# Patient Record
Sex: FEMALE | Race: BLACK OR AFRICAN AMERICAN | Employment: FULL TIME | URBAN - METROPOLITAN AREA
[De-identification: names, ages, dates, MRNs, and addresses within clinical notes are randomized per-mention and may not be internally consistent; named-entity substitution may affect disease eponyms.]

---

## 2018-08-14 ENCOUNTER — HOSPITAL ENCOUNTER (OUTPATIENT)
Dept: PHYSICAL THERAPY | Age: 28
Discharge: HOME | End: 2018-08-14
Payer: SELF-PAY

## 2018-08-14 PROCEDURE — 97110 THERAPEUTIC EXERCISES: CPT

## 2018-08-14 PROCEDURE — 97161 PT EVAL LOW COMPLEX 20 MIN: CPT

## 2018-08-14 PROCEDURE — 97140 MANUAL THERAPY 1/> REGIONS: CPT

## 2018-08-14 NOTE — PROGRESS NOTES
PHYSICAL THERAPY - DAILY TREATMENT NOTE    Patient Name: Kun Weeks        Date: 2018  : 1990   yes Patient  Verified  Visit #:   1   of   8  Insurance: Payor: SELF PAY / Plan: [employer] / Product Type: Self Pay /      In time: 2:30 Out time: 3:28   Total Treatment Time: 58     Medicare/[facility] Time Tracking (below)   Total Timed Codes (min):  N/A 1:1 Treatment Time:  N/A     TREATMENT AREA =  Low back pain [M54.5]    SUBJECTIVE  Pain Level (on 0 to 10 scale):  6  / 10   Medication Changes/New allergies or changes in medical history, any new surgeries or procedures?    no  If yes, update Summary List   Subjective Functional Status/Changes:  []  No changes reported     See initial eval          OBJECTIVE  Modalities Rationale:     decrease inflammation and decrease pain to improve patient's ability to complete transfers, sitting, prolonged standing   min [] Estim, type/location:                                      []  att     []  unatt     []  w/US     []  w/ice    []  w/heat    min []  Mechanical Traction: type/lbs                   []  pro   []  sup   []  int   []  cont    []  before manual    []  after manual    min []  Ultrasound, settings/location:      min []  Iontophoresis w/ dexamethasone, location:                                               []  take home patch       []  in clinic   10 min [x]  Ice     []  Heat    location/position: Supine to l/s with legs elevated on wedge    min []  Vasopneumatic Device, press/temp:     min []  Other:    [x] Skin assessment post-treatment (if applicable):    [x]  intact    [x]  no adverse reaction     []redness  adverse reaction:        10 min Therapeutic Exercise:  [x]  See flow sheet   Rationale:      increase ROM and increase strength to improve the patients ability to complete transfers, sitting. 12 min Manual Therapy: MET to correct L post rotated innominate. STM to L glute max/med, piriformis, QL and l/s paraspinals in R side-lying. Rationale:      decrease pain, increase ROM and decrease trigger points to improve patient's ability to complete transfers, sitting. N/A min Patient Education:  yes  Reviewed HEP   []  Progressed/Changed HEP based on:  Pt instructed on and given HEP to be completed daily. Pt educated on correct body mechanics with transfers. Pt educated on red flags and to seek immediate medical attention/911 if those occur. Pt instructed to use ice prn 10-15 minutes on / 1 hour off. Reviewed POC and goals. Pt reports understanding. Other Objective/Functional Measures:    See initial eval     Post Treatment Pain Level (on 0 to 10) scale:   4  / 10     ASSESSMENT  Assessment/Changes in Function:     See initial eval     []  See Progress Note/Recertification   Patient will continue to benefit from skilled PT services to see initial eval   Progress toward goals / Updated goals:    Pt denied sharp pains or red flags with therapeutic ex or initial eval. Pt reported an improvement in pain/sxs at end of session. See initial eval.     PLAN  [x]  Upgrade activities as tolerated yes Continue plan of care   []  Discharge due to :    [x]  Other: PT 2x/week for 4 weeks.      Therapist: Paula Bartholomew PT    Date: 8/14/2018 Time: 3:28 PM     Future Appointments  Date Time Provider Raquel Conteh   8/21/2018 4:30  [facility]   8/23/2018 3:00  [facility]   8/28/2018 3:30  [facility]   8/30/2018 4:30  [facility]   9/4/2018 3:30 PM Paula Bartholomew PT [facility]   9/6/2018 3:30  [facility]   9/11/2018 3:30 PM Paula Bartholomew PT [facility]   9/13/2018 3:30 PM Tim Monreal [address] [facility]
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
## 2018-08-21 ENCOUNTER — HOSPITAL ENCOUNTER (OUTPATIENT)
Dept: PHYSICAL THERAPY | Age: 28
Discharge: HOME | End: 2018-08-21
Payer: SELF-PAY

## 2018-08-21 PROCEDURE — 97140 MANUAL THERAPY 1/> REGIONS: CPT

## 2018-08-21 PROCEDURE — 97110 THERAPEUTIC EXERCISES: CPT

## 2018-08-23 ENCOUNTER — HOSPITAL ENCOUNTER (OUTPATIENT)
Dept: PHYSICAL THERAPY | Age: 28
End: 2018-08-23
Payer: SELF-PAY

## 2018-08-28 ENCOUNTER — HOSPITAL ENCOUNTER (OUTPATIENT)
Dept: PHYSICAL THERAPY | Age: 28
Discharge: HOME | End: 2018-08-28
Payer: SELF-PAY

## 2018-08-28 PROCEDURE — 97140 MANUAL THERAPY 1/> REGIONS: CPT

## 2018-08-28 PROCEDURE — 97110 THERAPEUTIC EXERCISES: CPT

## 2018-08-28 NOTE — PROGRESS NOTES
PHYSICAL THERAPY - DAILY TREATMENT NOTE Patient Name: Faviola Hernandez        Date: 2018 : 1990   yes Patient  Verified Visit #:   3   of   8  Insurance: Payor: SELF PAY / Plan: [facility] SELF PAY / Product Type: Self Pay / In time: 3:25 Out time: 4:32 Total Treatment Time: 79 Medicare/[facility] Time Tracking (below) Total Timed Codes (min):  NA 1:1 Treatment Time:  NA  
 
TREATMENT AREA =  Low back pain [M54.5] SUBJECTIVE Pain Level (on 0 to 10 scale):  2  / 10 Medication Changes/New allergies or changes in medical history, any new surgeries or procedures?    no  If yes, update Summary List  
Subjective Functional Status/Changes:  []  No changes reported Patient reports she took 800mg ibuprofen 45 minutes prior to today's PT session. 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 \"I was able to sit there for an hour before the pain really set in and I took three 2 minute breaks to help my back. \"  
Also reports she went \"swimming on  for 1.5 hour, took a 45 minute break, then swam for another 45 minutes. I think I might've overdone it because my back was bothering me. \" OBJECTIVE Modalities Rationale:     decrease inflammation and decrease pain to improve patient's ability to perform transfers and prolonged sitting/standing activities  
 min [] Estim, type/location:    
                                 []  att     []  unatt     []  w/US     []  w/ice    []  w/heat  
 min []  Mechanical Traction: type/lbs   
               []  pro   []  sup   []  int   []  cont    []  before manual    []  after manual  
 min []  Ultrasound, settings/location:    
 min []  Iontophoresis w/ dexamethasone, location:   
                                           []  take home patch       []  in clinic  
10 min [x]  Ice     []  Heat    location/position: Supine with wedge to L/S post-session min []  Vasopneumatic Device, press/temp:   
 min []  Other:   
[] Skin assessment post-treatment (if applicable):   
[]  intact    []  redness- no adverse reaction    
[]redness  adverse reaction:     
 
47 min Therapeutic Exercise:  [x]  See flow sheet Rationale:      increase ROM and increase strength to improve the patients ability to perform pain-free sitting/standing activities 10 min Manual Therapy: STM to L L/S paraspinals, QL, glute med/piriformis in R sidelying; SI check - even Rationale:      decrease pain, increase ROM and decrease trigger points to improve patient's ability to return to pain-free sitting during work activities. min Therapeutic Activity:   
Rationale:     
 
 min Neuromuscular Re-ed:   
Rationale:  
 
 min Gait Training:   
Rationale:     
 
 min Patient Education:  yes  Reviewed HEP []  Progressed/Changed HEP based on: Other Objective/Functional Measures: 
 
Presented with increased tenderness and tone along L/S paraspinals and glute med/piriformis; noted minimal wincing during MT Added standing hip abd/extension and SLS with ab draw Post Treatment Pain Level (on 0 to 10) scale:   2  / 10 ASSESSMENT Assessment/Changes in Function:  
 
Continues to require cues for TA activation and maintain neutral spine/PPT during exercises. Demonstrated good tolerance during PT session; noted no exacerbation of symptoms or red flags. Discussed and educated patient on proper work station posture to decrease c/o back pain; patient responded with good understanding. []  See Progress Note/Recertification Patient will continue to benefit from skilled PT services to modify and progress therapeutic interventions, address functional mobility deficits, address ROM deficits, address strength deficits, analyze and address soft tissue restrictions, analyze and cue movement patterns, analyze and modify body mechanics/ergonomics and assess and modify postural abnormalities to attain remaining goals. Progress toward goals / Updated goals: 
 
Reports compliance with HEP (STG #1 MET) Slow progress towards L/S ROM goals PLAN 
[]  Upgrade activities as tolerated yes Continue plan of care  
[]  Discharge due to :   
[]  Other:   
 
Therapist: Charline Angelucci, LPTA Date: 8/28/2018 Time: 6:18 PM  
 
Future Appointments Date Time Provider Raquel Conteh 8/28/2018 3:30 PM [address] [place] [address]  
8/30/2018 4:30 PM Charline Angelucci [facility]  
9/4/2018 3:30 PM Mg Beverage, PT [facility]  
9/6/2018 3:30 PM [address] [place] [address]  
9/11/2018 3:30 PM Mg Beverage, PT [facility]  
9/13/2018 3:30 PM Abhi Patel [address] [place] [address]

## 2018-08-30 ENCOUNTER — HOSPITAL ENCOUNTER (OUTPATIENT)
Dept: PHYSICAL THERAPY | Age: 28
Discharge: HOME | End: 2018-08-30
Payer: SELF-PAY

## 2018-08-30 PROCEDURE — 97110 THERAPEUTIC EXERCISES: CPT

## 2018-08-30 PROCEDURE — 97140 MANUAL THERAPY 1/> REGIONS: CPT

## 2018-08-30 NOTE — PROGRESS NOTES
PHYSICAL THERAPY - DAILY TREATMENT NOTE Patient Name: Lachelle Perez        Date: 2018 : 1990   yes Patient  Verified Visit #:   4   of   8  Insurance: Payor: SELF PAY / Plan: [facility] SELF PAY / Product Type: Self Pay / In time: 4:25 Out time: 5:28 Total Treatment Time: 61 Medicare/[employer] Time Tracking (below) Total Timed Codes (min):  NA 1:1 Treatment Time:  NA  
 
TREATMENT AREA =  Low back pain [M54.5] SUBJECTIVE Pain Level (on 0 to 10 scale):  7  / 10 Medication Changes/New allergies or changes in medical history, any new surgeries or procedures?    no  If yes, update Summary List  
Subjective Functional Status/Changes:  []  No changes reported \"It's been hurting on and off for the last 3 days in my L butt area. No pain in the back. I notice it more in the morning and randomly at work. I can sit for ~20 minutes before I really feel it. I didn't take that ibuprofen cause I can tolerate it\" OBJECTIVE Modalities Rationale:     decrease inflammation and decrease pain to improve patient's ability to perform transfers and prolonged sitting/standing activities  
 min [] Estim, type/location:    
                                 []  att     []  unatt     []  w/US     []  w/ice    []  w/heat  
 min []  Mechanical Traction: type/lbs   
               []  pro   []  sup   []  int   []  cont    []  before manual    []  after manual  
 min []  Ultrasound, settings/location:    
 min []  Iontophoresis w/ dexamethasone, location:   
                                           []  take home patch       []  in clinic  
10 min [x]  Ice     []  Heat    location/position: Supine with wedge to L/S post-session  
 min []  Vasopneumatic Device, press/temp:   
 min []  Other:   
[] Skin assessment post-treatment (if applicable):   
[]  intact    []  redness- no adverse reaction    
[]redness  adverse reaction:     
 
43 min Therapeutic Exercise:  [x]  See flow sheet Rationale:      increase ROM and increase strength to improve the patients ability to perform pain-free sitting/standing activities 10 min Manual Therapy: STM/DTM to glute med/piriformis in R sidelying; SI check - L posterior innominate; MET for even pelvis Rationale:      decrease pain, increase ROM and decrease trigger points to improve patient's ability to return to pain-free sitting during work activities. min Therapeutic Activity:   
Rationale:     
 
 min Neuromuscular Re-ed:   
Rationale:  
 
 min Gait Training:   
Rationale:     
 
 min Patient Education:  yes  Reviewed HEP []  Progressed/Changed HEP based on: Other Objective/Functional Measures: 
 
TE per flowsheet Increased balance time for SLS with ab draw Added small towel roll to L/S during PPT for tactile cue Post Treatment Pain Level (on 0 to 10) scale:   2  / 10 ASSESSMENT Assessment/Changes in Function:  
 
Demonstrated improved tolerance with exercises following MT; noted overall improved L/S AROM and decrease low back pain following today's PT session. []  See Progress Note/Recertification Patient will continue to benefit from skilled PT services to modify and progress therapeutic interventions, address functional mobility deficits, address ROM deficits, address strength deficits, analyze and address soft tissue restrictions, analyze and cue movement patterns, analyze and modify body mechanics/ergonomics and assess and modify postural abnormalities to attain remaining goals. Progress toward goals / Updated goals: 
 
Good progress towards STG #2 PLAN 
[]  Upgrade activities as tolerated yes Continue plan of care  
[]  Discharge due to :   
[]  Other:   
 
Therapist: ONEL Fernandez Date: 8/30/2018 Time: 6:54 PM  
 
Future Appointments Date Time Provider Raquel Conteh 8/30/2018 4:30 PM [address]  
9/4/2018 3:30 PM [address] 9/6/2018 3:30 PM [address] [facility]  
9/11/2018 3:30 PM RAMSES Montelongo [facility]  
9/13/2018 3:30 PM Earle Hernandez 600 [facility]

## 2018-09-04 ENCOUNTER — HOSPITAL ENCOUNTER (OUTPATIENT)
Dept: PHYSICAL THERAPY | Age: 28
Discharge: HOME | End: 2018-09-04
Payer: SELF-PAY

## 2018-09-04 PROCEDURE — 97140 MANUAL THERAPY 1/> REGIONS: CPT

## 2018-09-04 PROCEDURE — 97110 THERAPEUTIC EXERCISES: CPT

## 2018-09-04 NOTE — PROGRESS NOTES
PHYSICAL THERAPY - DAILY TREATMENT NOTE Patient Name: lCiff Worley        Date: 2018 : 1990   yes Patient  Verified Visit #:   5   of   8  Insurance: Payor: SELF PAY / Plan: [facility] SELF PAY / Product Type: Self Pay / In time: 3:28 Out time: 4:38 Total Treatment Time: 70 Medicare/[employer] Time Tracking (below) Total Timed Codes (min):  N/A 1:1 Treatment Time:  N/A  
 
TREATMENT AREA =  Low back pain [M54.5] SUBJECTIVE Pain Level (on 0 to 10 scale):  5  / 10 Medication Changes/New allergies or changes in medical history, any new surgeries or procedures?    no  If yes, update Summary List  
Subjective Functional Status/Changes:  []  No changes reported Avg pain level; 5/10, Max pain level: 8/10 (prolonged standing in heels at her sister's wedding this past Saturday). Pt reports compliance with HEP. Pt denies falls or red flags. OBJECTIVE Modalities Rationale:     decrease inflammation and decrease pain to improve patient's ability to complete prolonged standing. 
 min [] Estim, type/location:    
                                 []  att     []  unatt     []  w/US     []  w/ice    []  w/heat  
 min []  Mechanical Traction: type/lbs   
               []  pro   []  sup   []  int   []  cont    []  before manual    []  after manual  
 min []  Ultrasound, settings/location:    
 min []  Iontophoresis w/ dexamethasone, location:   
                                           []  take home patch       []  in clinic  
10 min [x]  Ice     []  Heat    location/position: Supine to l/s and glutes  
 min []  Vasopneumatic Device, press/temp:   
 min []  Other:   
[] Skin assessment post-treatment (if applicable):   
[]  intact    []  redness- no adverse reaction    
[]redness  adverse reaction:     
 
48 min Therapeutic Exercise:  [x]  See flow sheet Rationale:      increase ROM, increase strength and increase proprioception to improve the patients ability to complete prolonged standing. 12 min Manual Therapy: STM to L>R glute max/med, piriformis, QL and l/s paraspinals in prone. Rationale:      decrease pain, increase ROM and decrease trigger points to improve patient's ability to complete prolonged standing. N/A min Patient Education:  yes  Reviewed HEP []  Progressed/Changed HEP based on:  Reviewed importance of body mechanics. Pt instructed to use CP/ice prn 10-15 minutes on / 1 hour off. Pt reports understanding. Other Objective/Functional Measures: 
 
Increased reps to improve glute strength/stability Added: Pall of press and mini-bands to improve core stability and glute strength L/s AROM: WFL B lat flex, all other motions decreased 25% Post Treatment Pain Level (on 0 to 10) scale:   3  / 10 ASSESSMENT Assessment/Changes in Function:  
 
Pt denied sharp pains or red flags with therapeutic ex. Pt reported an improvement in pain/sxs at end of session. []  See Progress Note/Recertification Patient will continue to benefit from skilled PT services to modify and progress therapeutic interventions, address functional mobility deficits, address ROM deficits, address strength deficits, analyze and address soft tissue restrictions and analyze and cue movement patterns to attain remaining goals. Progress toward goals / Updated goals: 
 
8/10 recent max pain levels - slow progress towards LTG #3. Pt demonstrates improvements in l/s AROM, progressing towards LTG #1. PLAN [x]  Upgrade activities as tolerated yes Continue plan of care  
[]  Discharge due to :   
[x]  Other: Reassess FOTO and GROC next session Therapist: Wade Cardenas PT Date: 9/4/2018 Time: 3:26 PM  
 
Future Appointments Date Time Provider Raquel Conteh 9/4/2018 3:30 PM Wade Cardenas PT [facility]  
9/6/2018 3:30 PM [address] [facility]  
9/11/2018 3:30 PM Wade Cardenas PT [facility]  
9/13/2018 3:30  [facility] 9/18/2018 3:00 PM Rose. Carlito [address] [facility]

## 2018-09-06 ENCOUNTER — HOSPITAL ENCOUNTER (OUTPATIENT)
Dept: PHYSICAL THERAPY | Age: 28
Discharge: HOME | End: 2018-09-06
Payer: SELF-PAY

## 2018-09-06 PROCEDURE — 97140 MANUAL THERAPY 1/> REGIONS: CPT

## 2018-09-06 PROCEDURE — 97110 THERAPEUTIC EXERCISES: CPT

## 2018-09-06 NOTE — PROGRESS NOTES
PHYSICAL THERAPY - DAILY TREATMENT NOTE Patient Name: Kun Weeks        Date: 2018 : 1990   yes Patient  Verified Visit #:   6   of   8  Insurance: Payor: SELF PAY / Plan: [facility] SELF PAY / Product Type: Self Pay / In time: 3:29 Out time: 4:38 Total Treatment Time: 71 Medicare/BCBS Time Tracking (below) Total Timed Codes (min):  NA 1:1 Treatment Time:  NA  
 
TREATMENT AREA =  Low back pain [M54.5] SUBJECTIVE Pain Level (on 0 to 10 scale):  5  / 10 Medication Changes/New allergies or changes in medical history, any new surgeries or procedures?    no  If yes, update Summary List  
Subjective Functional Status/Changes:  []  No changes reported \"I had to do a lot at work today. I had to move all these files from one file cabinet to another and I was carrying big stacks of the files. I probably didn't have the best posture when I was doing it either and that's why it hurts. But I have been using that towel roll to my low back in my office chair and car. I've noticed the difference. I did walk outside for 45 minutes OBJECTIVE Modalities Rationale:     decrease inflammation and decrease pain to improve patient's ability to perform transfers and prolonged sitting/standing activities  
 min [] Estim, type/location:    
                                 []  att     []  unatt     []  w/US     []  w/ice    []  w/heat  
 min []  Mechanical Traction: type/lbs   
               []  pro   []  sup   []  int   []  cont    []  before manual    []  after manual  
 min []  Ultrasound, settings/location:    
 min []  Iontophoresis w/ dexamethasone, location:   
                                           []  take home patch       []  in clinic  
10 min [x]  Ice     []  Heat    location/position: Supine with wedge to L/S post-session  
 min []  Vasopneumatic Device, press/temp:   
 min []  Other:   
[] Skin assessment post-treatment (if applicable):   
 []  intact    []  redness- no adverse reaction    
[]redness  adverse reaction:     
 
44 min Therapeutic Exercise:  [x]  See flow sheet Rationale:      increase ROM and increase strength to improve the patients ability to perform pain-free sitting/standing activities 15 min Manual Therapy: STM/DTM to glute med/piriformis in R sidelying; SI check - L post innominate; MET correction for innominate Rationale:      decrease pain, increase ROM and decrease trigger points to improve patient's ability to return to pain-free sitting during work activities. min Therapeutic Activity:   
Rationale:     
 
 min Neuromuscular Re-ed:   
Rationale:  
 
 min Gait Training:   
Rationale:     
 
 min Patient Education:  yes  Reviewed HEP []  Progressed/Changed HEP based on: Other Objective/Functional Measures: FOTO: 48 (28 point increase) GROC: +2 
 
TE per flowsheet Post Treatment Pain Level (on 0 to 10) scale:   3  / 10 ASSESSMENT Assessment/Changes in Function:  
 
Req'd cues for hip hinging during miniband exercises; demonstrates excessive L/S extension during exercises. Able to perform therex with no increase low back pain and denies red flags. []  See Progress Note/Recertification Patient will continue to benefit from skilled PT services to modify and progress therapeutic interventions, address functional mobility deficits, address ROM deficits, address strength deficits, analyze and address soft tissue restrictions, analyze and cue movement patterns, analyze and modify body mechanics/ergonomics and assess and modify postural abnormalities to attain remaining goals. Progress toward goals / Updated goals: LTG #2 MET 
  
 
PLAN 
[]  Upgrade activities as tolerated yes Continue plan of care  
[]  Discharge due to :   
[]  Other:   
 
Therapist: ONEL He Date: 9/6/2018 Time: 5:49 PM  
 
Future Appointments Date Time Provider Raquel Conteh 9/6/2018 3:30 PM [address] [facility]  
9/11/2018 3:30 PM Billy Santos,  [facility]  
9/13/2018 3:30 PM [address] [facility]  
9/18/2018 3:00 PM Billy Santos,  [facility]

## 2018-09-11 ENCOUNTER — HOSPITAL ENCOUNTER (OUTPATIENT)
Dept: PHYSICAL THERAPY | Age: 28
Discharge: HOME | End: 2018-09-11
Payer: SELF-PAY

## 2018-09-11 PROCEDURE — 97110 THERAPEUTIC EXERCISES: CPT

## 2018-09-11 PROCEDURE — 97140 MANUAL THERAPY 1/> REGIONS: CPT

## 2018-09-11 NOTE — PROGRESS NOTES
PHYSICAL THERAPY - DAILY TREATMENT NOTE Patient Name: Edinson Castañeda        Date: 2018 : 1990   yes Patient  Verified Visit #:   7   of   8  Insurance: Payor: SELF PAY / Plan: [facility] SELF PAY / Product Type: Self Pay / In time: 3:33 Out time: 4:39 Total Treatment Time: 77 Medicare/[employer] Time Tracking (below) Total Timed Codes (min):  N/A 1:1 Treatment Time:  N/A  
 
TREATMENT AREA =  Low back pain [M54.5] SUBJECTIVE Pain Level (on 0 to 10 scale):  3   10 Medication Changes/New allergies or changes in medical history, any new surgeries or procedures?    no  If yes, update Summary List  
Subjective Functional Status/Changes:  []  No changes reported Avg pain level: /10, Max pain level 9/10 (shopping on Saturday, experienced this pain on . No other changes in activity levels). 60% overall improvement. Pt reports compliance with HEP. Pt denies falls or red flags. OBJECTIVE Modalities Rationale:     decrease inflammation and decrease pain to improve patient's ability to complete prolonged standing. 
 min [] Estim, type/location:    
                                 []  att     []  unatt     []  w/US     []  w/ice    []  w/heat  
 min []  Mechanical Traction: type/lbs   
               []  pro   []  sup   []  int   []  cont    []  before manual    []  after manual  
 min []  Ultrasound, settings/location:    
 min []  Iontophoresis w/ dexamethasone, location:   
                                           []  take home patch       []  in clinic  
10 min [x]  Ice     []  Heat    location/position: Pronee to l/s and glutes (per pt request)  
 min []  Vasopneumatic Device, press/temp:   
 min []  Other:   
[x] Skin assessment post-treatment (if applicable):   
[x]  intact    [x]  no adverse reaction    
[]redness  adverse reaction:     
 
44 min Therapeutic Exercise:  [x]  See flow sheet Rationale:      increase ROM, increase strength and increase proprioception to improve the patients ability to complete prolonged standing. 12 min Manual Therapy: STM to R QL and R>L l/s paraspinals in prone. Rationale:      decrease pain, increase ROM and decrease trigger points to improve patient's ability to complete prolonged standing. N/A min Patient Education:  yes  Reviewed HEP []  Progressed/Changed HEP based on:  Reviewed importance of body mechanics. Pt instructed to use CP/ice prn 10-15 minutes on / 1 hour off. Pt reports understanding. Other Objective/Functional Measures: 
 
Increased reps to improve glute stability; progressed to SL hip ABD to improve glute stability Post Treatment Pain Level (on 0 to 10) scale:  2  / 10 ASSESSMENT Assessment/Changes in Function:  
 
Pt denied sharp pains or red flags with therapeutic ex. Pt reported min improvement in pain/sxs at end of session. []  See Progress Note/Recertification Patient will continue to benefit from skilled PT services to modify and progress therapeutic interventions, address functional mobility deficits, address ROM deficits, address strength deficits, analyze and address soft tissue restrictions and analyze and cue movement patterns to attain remaining goals. Progress toward goals / Updated goals: 
 
Recent max pain level 9/10 - has not met LTG #3. PLAN [x]  Upgrade activities as tolerated yes Continue plan of care  
[]  Discharge due to :   
[]  Other:   
 
Therapist: Carol Damon PT Date: 9/11/2018 Time: 3:40 PM  
 
Future Appointments Date Time Provider Raquel Conteh 9/13/2018 3:30 PM [address] [facility]  
9/18/2018 3:00 PM Carol Damon PT [facility]

## 2018-09-13 ENCOUNTER — HOSPITAL ENCOUNTER (OUTPATIENT)
Dept: PHYSICAL THERAPY | Age: 28
End: 2018-09-13
Payer: SELF-PAY

## 2018-09-18 ENCOUNTER — HOSPITAL ENCOUNTER (OUTPATIENT)
Dept: PHYSICAL THERAPY | Age: 28
Discharge: HOME | End: 2018-09-18
Payer: SELF-PAY

## 2018-09-18 PROCEDURE — 97110 THERAPEUTIC EXERCISES: CPT

## 2018-09-18 PROCEDURE — 97140 MANUAL THERAPY 1/> REGIONS: CPT

## 2018-09-18 NOTE — PROGRESS NOTES
PHYSICAL THERAPY - DAILY TREATMENT NOTE Patient Name: Jas Mcgill        Date: 2018 : 1990   yes Patient  Verified Visit #:   8   of   15  Insurance: Payor: SELF PAY / Plan: [facility] SELF PAY / Product Type: Self Pay / In time: 3:02 Out time: 4:05 Total Treatment Time: 61 Medicare/[facility] Time Tracking (below) Total Timed Codes (min):  N/A 1:1 Treatment Time:  N/A  
 
TREATMENT AREA =  Low back pain [M54.5] SUBJECTIVE Pain Level (on 0 to 10 scale):  4  / 10 Medication Changes/New allergies or changes in medical history, any new surgeries or procedures?    no  If yes, update Summary List  
Subjective Functional Status/Changes:  []  No changes reported Pt reports compliance with HEP. Pt reports walking 5 miles over 3 days with some pain/soreness. Pt denies falls or red flags. OBJECTIVE Modalities Rationale:     decrease inflammation and decrease pain to improve patient's ability to complete prolonged standing. 
 min [] Estim, type/location:    
                                 []  att     []  unatt     []  w/US     []  w/ice    []  w/heat  
 min []  Mechanical Traction: type/lbs   
               []  pro   []  sup   []  int   []  cont    []  before manual    []  after manual  
 min []  Ultrasound, settings/location:    
 min []  Iontophoresis w/ dexamethasone, location:   
                                           []  take home patch       []  in clinic  
10 min [x]  Ice     []  Heat    location/position: Supine to l/s and glutes  
 min []  Vasopneumatic Device, press/temp:   
 min []  Other:   
[x] Skin assessment post-treatment (if applicable):   
[x]  intact    [x]  no adverse reaction    
[]redness  adverse reaction:     
 
41 min Therapeutic Exercise:  [x]  See flow sheet Rationale:      increase ROM, increase strength and increase proprioception to improve the patients ability to complete prolonged standing. 12 min Manual Therapy: STM to B glute med/max, QL and lumbosacral junction in prone. Rationale:      decrease pain, increase ROM and decrease trigger points to improve patient's ability to complete prolonged standing. N/A min Patient Education:  yes  Reviewed HEP []  Progressed/Changed HEP based on:  Reviewed importance of body mechanics. Pt instructed to use CP/ice prn 10-15 minutes on / 1 hour off. Pt reports understanding. Other Objective/Functional Measures: 
 
Increased resistance/reps to improve glute/core strength/stability Progressed to SL hip ABD and prone hip ext to improve glute/LE strength Post Treatment Pain Level (on 0 to 10) scale:  4  / 10 ASSESSMENT Assessment/Changes in Function:  
 
Pt denied sharp pains or red flags with therapeutic ex. []  See Progress Note/Recertification:  
Patient will continue to benefit from skilled PT services to modify and progress therapeutic interventions, address functional mobility deficits, address ROM deficits, address strength deficits, analyze and address soft tissue restrictions and analyze and cue movement patterns to attain remaining goals. Progress toward goals / Updated goals: Max pain level over past week: 5/10 - has met new goal #3. PLAN [x]  Upgrade activities as tolerated yes Continue plan of care  
[]  Discharge due to :   
[]  Other:   
 
Therapist: Bia Rodas PT Date: 9/18/2018 Time: 3:15 PM  
 
No future appointments.

## 2018-09-18 NOTE — PROGRESS NOTES
[address]  [facility] THERAPY 
[address], [place] [address],[facility], [address] [facility] - Phone: [phone]  Fax: [phone] PROGRESS NOTE Patient Name: Eve Browning : 1990 Treatment/Medical Diagnosis: Low back pain [M54.5], Acute B LBP without sciatica Referral Source: Luane Shone Date of Initial Visit: 18 Attended Visits: 7 Missed Visits: 2 SUMMARY OF TREATMENT Physical therapy treatment consists of therapeutic exercises to improve l/s AROM, core/glute/LE strength/stability, body mechanics and functional mobility; manual therapy including STM and MET to correct rotated innominate prn; application of CP prn; and instruction on HEP. CURRENT STATUS Pt demonstrates slow progress with pain levels, currently 5/10 avg pain level and 9/10 max pain levels. Pt reports 60% overall improvement, +2 on GROC indicating \"A little better\", 48/100 FOTO scores and compliance with HEP. Pt demonstrates improvements in l/s AROM, currently: WFL B lat flex, all other motions decreased 25%. Goal/Measure of Progress Goal Met? 1.  Pt to demonstrate l/s AROM WFL to improve mobility for transfers, ADLs and lifting/carrying objects. Status at last Eval: Flex, ext and L Rot decreased 75%, R Rot and L lat flex decreased 50%, R lat flex decreased 25% Current Status: WFL B lat flex, all other motions decreased 25% Progressing 2. Pt to report 20 point or > improvement on FOTO scores to improve functional mobility for stair negotiation and carrying/lifting objects. Status at last Eval:  Current Status: 48/100 yes 3. Pt to report 50% or > decrease in max pain levels to improve functional mobility for sitting, stair negotiation and carrying/lifting objects. Status at last Eval: 10/10 Current Status: 9/10 Progressing New Goals to be achieved in __3-4__  weeks: 1.  Pt to demonstrate l/s AROM WFL to improve mobility for transfers, ADLs and lifting/carrying objects. 2. Pt to report 57/100 or > on FOTO scores to improve functional mobility for stair negotiation and carrying/lifting objects. 3. Pt to report 50% or > decrease in max pain levels to improve functional mobility for sitting, stair negotiation and carrying/lifting objects. RECOMMENDATIONS Recommend patient continue with PT 2x/week for 3-4 more weeks to further improve upon l/s AROM, core/glute/LE strength, body mechanics, pain levels and functional mobility. Please advise. Thank you. If you have any questions/comments please contact us directly at [phone]. Thank you for allowing us to assist in the care of your patient. Therapist Signature: Shahid Ramirez PT Date: 9/18/2018 Time: 7:37 AM  
NOTE TO PHYSICIAN:  PLEASE COMPLETE THE ORDERS BELOW AND FAX TO [facility] Physical Therapy: [phone] If you are unable to process this request in 24 hours please contact our office: [phone] 
 
___ I have read the above report and request that my patient continue as recommended.  
___ I have read the above report and request that my patient continue therapy with the following changes/special instructions:_________________________________________________________  
___ I have read the above report and request that my patient be discharged from therapy.   
 
Physician Signature:        Date:       Time:

## 2018-09-25 ENCOUNTER — HOSPITAL ENCOUNTER (OUTPATIENT)
Dept: PHYSICAL THERAPY | Age: 28
Discharge: HOME | End: 2018-09-25
Payer: SELF-PAY

## 2018-09-25 PROCEDURE — 97140 MANUAL THERAPY 1/> REGIONS: CPT

## 2018-09-25 PROCEDURE — 97110 THERAPEUTIC EXERCISES: CPT

## 2018-09-25 NOTE — PROGRESS NOTES
PHYSICAL THERAPY - DAILY TREATMENT NOTE Patient Name: Cheri Ribeiro        Date: 2018 : 1990   yes Patient  Verified Visit #:   9   of   8  Insurance: Payor: SELF PAY / Plan: [facility] SELF PAY / Product Type: Self Pay / In time: 3:32 Out time: 4:39 Total Treatment Time: 79 Medicare/BCBS Time Tracking (below) Total Timed Codes (min):  NA 1:1 Treatment Time:  NA  
 
TREATMENT AREA =  Low back pain [M54.5] SUBJECTIVE Pain Level (on 0 to 10 scale):  4-5  / 10 Medication Changes/New allergies or changes in medical history, any new surgeries or procedures?    no  If yes, update Summary List  
Subjective Functional Status/Changes:  []  No changes reported \"I was in the car over the weekend for 10+ hrs and my back was really hurting so I took a muscle relaxer. 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 I had to  a bunch of stuff off the floor and then carry it to another office, it wasn't heavy stuff though, but I noticed the bending part is what really bothered me. \" OBJECTIVE Modalities Rationale:     decrease inflammation and decrease pain to improve patient's ability to perform transfers and prolonged sitting/standing activities  
 min [] Estim, type/location:    
                                 []  att     []  unatt     []  w/US     []  w/ice    []  w/heat  
 min []  Mechanical Traction: type/lbs   
               []  pro   []  sup   []  int   []  cont    []  before manual    []  after manual  
 min []  Ultrasound, settings/location:    
 min []  Iontophoresis w/ dexamethasone, location:   
                                           []  take home patch       []  in clinic At home min [x]  Ice     []  Heat    location/position: Supine with wedge to L/S post-session  
 min []  Vasopneumatic Device, press/temp:   
 min []  Other:   
[] Skin assessment post-treatment (if applicable):   
 []  intact    []  redness- no adverse reaction    
[]redness  adverse reaction:     
 
45/57 min Therapeutic Exercise:  [x]  See flow sheet Rationale:      increase ROM and increase strength to improve the patients ability to perform prolonged sitting/standing activities 10 min Manual Therapy: STM/DTM to R glute med/piriformis, lumbosacral junction, and QL in prone; SI check - even Rationale:      decrease pain, increase ROM and decrease trigger points to improve patient's ability to return to pain-free sitting activities during work. min Therapeutic Activity:   
Rationale:     
 
 min Neuromuscular Re-ed:   
Rationale:  
 
 min Gait Training:   
Rationale:     
 
 min Patient Education:  yes  Reviewed HEP []  Progressed/Changed HEP based on: Other Objective/Functional Measures: Added mini squats at table; focusing on hip hinging technique Post Treatment Pain Level (on 0 to 10) scale:   2  / 10 ASSESSMENT Assessment/Changes in Function:  
 
Discussed and educated patient on notifying therapist if pain level increases during therex prior to beginning PT session. Educated patient on importance of limiting activities that increase pain throughout the day. Patient responded with good understanding. Demonstrated good tolerance with therex. Notes glute ms soreness, however denies sharp pain or red flags during PT session. []  See Progress Note/Recertification Patient will continue to benefit from skilled PT services to modify and progress therapeutic interventions, address functional mobility deficits, address ROM deficits, address strength deficits, analyze and address soft tissue restrictions, analyze and cue movement patterns, analyze and modify body mechanics/ergonomics and assess and modify postural abnormalities to attain remaining goals. Progress toward goals / Updated goals: 
 
Good progress towards LTGs #1 and #3 PLAN 
 []  Upgrade activities as tolerated yes Continue plan of care  
[]  Discharge due to :   
[]  Other:   
 
Therapist: ONEL Benz Date: 9/25/2018 Time: 5:49 PM  
 
Future Appointments Date Time Provider Raquel Conteh 9/25/2018 3:30 PM [address] [place] [address]  
9/26/2018 4:00 PM [address] [place] [address]  
10/2/2018 4:00 PM Kathrine Hya [address] [place] [address]  
10/4/2018 4:00 PM Kathrine Hay [address] [place] [address]  
10/9/2018 3:30 PM Ana M Marte,  [facility]  
10/11/2018 4:00 PM [address] [place] [address]  
10/16/2018 3:30 PM Ana M Marte,  [facility]  
10/18/2018 4:00 PM [address] [place] [address]

## 2018-09-26 ENCOUNTER — APPOINTMENT (OUTPATIENT)
Dept: PHYSICAL THERAPY | Age: 28
End: 2018-09-26
Payer: SELF-PAY

## 2018-09-27 ENCOUNTER — HOSPITAL ENCOUNTER (OUTPATIENT)
Dept: PHYSICAL THERAPY | Age: 28
Discharge: HOME | End: 2018-09-27
Payer: SELF-PAY

## 2018-09-27 PROCEDURE — 97110 THERAPEUTIC EXERCISES: CPT

## 2018-09-27 NOTE — PROGRESS NOTES
PHYSICAL THERAPY - DAILY TREATMENT NOTE Patient Name: Dara Sterling        Date: 2018 : 1990   yes Patient  Verified Visit #:   10   of   15  Insurance: Payor: SELF PAY / Plan: [facility] SELF PAY / Product Type: Self Pay / In time: 3:12 Out time: 4:17 Total Treatment Time: 72 Medicare/BCBS Time Tracking (below) Total Timed Codes (min):  NA 1:1 Treatment Time:  NA  
 
TREATMENT AREA =  Low back pain [M54.5] SUBJECTIVE Pain Level (on 0 to 10 scale):  2 / 10 Medication Changes/New allergies or changes in medical history, any new surgeries or procedures?    no  If yes, update Summary List  
Subjective Functional Status/Changes:  []  No changes reported Patient reports feeling much better today despite sitting in tunnel traffic. Presented to PT session 12 minutes late; agreed to modified PT session, OBJECTIVE Modalities Rationale:     decrease inflammation and decrease pain to improve patient's ability to perform transfers and prolonged sitting/standing activities  
 min [] Estim, type/location:    
                                 []  att     []  unatt     []  w/US     []  w/ice    []  w/heat  
 min []  Mechanical Traction: type/lbs   
               []  pro   []  sup   []  int   []  cont    []  before manual    []  after manual  
 min []  Ultrasound, settings/location:    
 min []  Iontophoresis w/ dexamethasone, location:   
                                           []  take home patch       []  in clinic  
10 min [x]  Ice     []  Heat    location/position: Supine with wedge to L/S post-session  
 min []  Vasopneumatic Device, press/temp:   
 min []  Other:   
[] Skin assessment post-treatment (if applicable):   
[]  intact    []  redness- no adverse reaction    
[]redness  adverse reaction:     
 
55 min Therapeutic Exercise:  [x]  See flow sheet Rationale:      increase ROM and increase strength to improve the patients ability to perform prolonged sitting/standing activities TC min Manual Therapy:   
Rationale:      decrease pain, increase ROM and decrease trigger points to improve patient's ability to return to pain-free sitting activities during work. min Therapeutic Activity:   
Rationale:     
 
 min Neuromuscular Re-ed:   
Rationale:  
 
 min Gait Training:   
Rationale:     
 
 min Patient Education:  yes  Reviewed HEP []  Progressed/Changed HEP based on: Other Objective/Functional Measures: Added box lifts (ground to shelf, shelf to [facility] shelf) and carries to improve activity tolerance at work Post Treatment Pain Level (on 0 to 10) scale:   1 / 10 ASSESSMENT Assessment/Changes in Function:  
 
Demonstrated good tolerance with progression of exercises; denies sharp pain or red flags during therex. Req'd cues for WBOS and hip hinging during box lifts and TA draw during box carries []  See Progress Note/Recertification Patient will continue to benefit from skilled PT services to modify and progress therapeutic interventions, address functional mobility deficits, address ROM deficits, address strength deficits, analyze and address soft tissue restrictions, analyze and cue movement patterns, analyze and modify body mechanics/ergonomics and assess and modify postural abnormalities to attain remaining goals. Progress toward goals / Updated goals: 
 
Good progress towards LTG #3 PLAN 
[]  Upgrade activities as tolerated yes Continue plan of care  
[]  Discharge due to :   
[]  Other:   
 
Therapist: ONEL Rivero Date: 9/27/2018 Time: 5:32 PM  
 
Future Appointments Date Time Provider Raquel Conteh 9/27/2018 3:00 PM [address] [place] [address]  
10/2/2018 4:00 PM Nola Vasquez [facility]  
10/4/2018 4:00 PM Nola Vasquez [facility]  
10/9/2018 3:30 PM Korey Cantor, PT [facility]  
10/11/2018 4:00 PM [address] [place] [address] 10/16/2018 3:30 PM Aurelia Bean, PT [facility]  
10/18/2018 4:00 PM [address] [facility]

## 2018-10-02 ENCOUNTER — HOSPITAL ENCOUNTER (OUTPATIENT)
Dept: PHYSICAL THERAPY | Age: 28
Discharge: HOME | End: 2018-10-02
Payer: SELF-PAY

## 2018-10-02 PROCEDURE — 97110 THERAPEUTIC EXERCISES: CPT

## 2018-10-02 NOTE — PROGRESS NOTES
PHYSICAL THERAPY - DAILY TREATMENT NOTE Patient Name: Zion Jaramillo        Date: 10/2/2018 : 1990   yes Patient  Verified Visit #:      of   15  Insurance: Payor: SELF PAY / Plan: [facility] SELF PAY / Product Type: Self Pay / In time: 4:10 PM Out time: 5:05 PM  
Total Treatment Time: 54 Medicare/BCBS Time Tracking (below) Total Timed Codes (min):  NA 1:1 Treatment Time:  NA  
 
TREATMENT AREA =  Low back pain [M54.5] SUBJECTIVE Pain Level (on 0 to 10 scale):  1 / 10 Medication Changes/New allergies or changes in medical history, any new surgeries or procedures?    no  If yes, update Summary List  
Subjective Functional Status/Changes:  []  No changes reported Patient reports feeling overall better and states that she has been having someone else at work carry boxes to avoid increase low back pain. Presented to PT session 10 minutes late; patient agreed to modified PT session OBJECTIVE Modalities Rationale:     decrease inflammation and decrease pain to improve patient's ability to perform transfers and prolonged sitting/standing activities  
 min [] Estim, type/location:    
                                 []  att     []  unatt     []  w/US     []  w/ice    []  w/heat  
 min []  Mechanical Traction: type/lbs   
               []  pro   []  sup   []  int   []  cont    []  before manual    []  after manual  
 min []  Ultrasound, settings/location:    
 min []  Iontophoresis w/ dexamethasone, location:   
                                           []  take home patch       []  in clinic PD min [x]  Ice     []  Heat    location/position: To L/S; supine with wedge post-session  
 min []  Vasopneumatic Device, press/temp:   
 min []  Other:   
[] Skin assessment post-treatment (if applicable):   
[]  intact    []  redness- no adverse reaction    
[]redness  adverse reaction:     
 
55 min Therapeutic Exercise:  [x]  See flow sheet Rationale:      increase ROM and increase strength to improve the patients ability to perform prolonged sitting/standing activities TC min Manual Therapy: STM/DTM to R glute med/piriformis, lumbosacral junction, and QL in prone; SI check Rationale:      decrease pain, increase ROM and decrease trigger points to improve patient's ability to return to pain-free sitting activities during work. min Therapeutic Activity:   
Rationale:     
 
 min Neuromuscular Re-ed:   
Rationale:  
 
 min Gait Training:   
Rationale:     
 
 min Patient Education:  yes  Reviewed HEP []  Progressed/Changed HEP based on: Other Objective/Functional Measures: 
 
Increased balance time for SLS Increased repetitions for S/L clams Post Treatment Pain Level (on 0 to 10) scale:  1 / 10 ASSESSMENT Assessment/Changes in Function:  
 
Noted increase low back discomfort during box lifts (floor to chest); req'd cues for TA activation and neutral spine during box lifts. Noted decrease discomfort following cues []  See Progress Note/Recertification Patient will continue to benefit from skilled PT services to modify and progress therapeutic interventions, address functional mobility deficits, address ROM deficits, address strength deficits, analyze and address soft tissue restrictions, analyze and cue movement patterns, analyze and modify body mechanics/ergonomics and assess and modify postural abnormalities to attain remaining goals. Progress toward goals / Updated goals: 
 
Good progress towards pain reduction goals PLAN 
[]  Upgrade activities as tolerated yes Continue plan of care  
[]  Discharge due to :   
[]  Other:   
 
Therapist: ONEL Rivero Date: 10/2/2018 Time: 6:45 PM  
 
Future Appointments Date Time Provider Raqeul Conteh 10/2/2018 4:00 PM Nola Vasquez [facility]  
10/4/2018 4:00 PM Nola Vasquez [facility]  
10/9/2018 3:30 PM Korey Cantor, RAMSES [facility] 10/11/2018 4:00  [facility]  
10/16/2018 3:30 PM Linda Chaves PT [facility]  
10/18/2018 4:00 PM 1316 [facility]

## 2018-10-04 ENCOUNTER — HOSPITAL ENCOUNTER (OUTPATIENT)
Dept: PHYSICAL THERAPY | Age: 28
Discharge: HOME | End: 2018-10-04
Payer: SELF-PAY

## 2018-10-04 PROCEDURE — 97140 MANUAL THERAPY 1/> REGIONS: CPT

## 2018-10-04 PROCEDURE — 97110 THERAPEUTIC EXERCISES: CPT

## 2018-10-04 NOTE — PROGRESS NOTES
PHYSICAL THERAPY - DAILY TREATMENT NOTE Patient Name: Minnie Acosta        Date: 10/4/2018 : 1990   yes Patient  Verified Visit #:   12   of   15  Insurance: Payor: SELF PAY / Plan: [facility] SELF PAY / Product Type: Self Pay / In time: 4:02 PM Out time: 4:55 PM  
Total Treatment Time: 48 Medicare/[facility] Time Tracking (below) Total Timed Codes (min):  NA 1:1 Treatment Time:  NA  
 
TREATMENT AREA =  Low back pain [M54.5] SUBJECTIVE Pain Level (on 0 to 10 scale):  1 / 10 Medication Changes/New allergies or changes in medical history, any new surgeries or procedures?    no  If yes, update Summary List  
Subjective Functional Status/Changes:  []  No changes reported SEE PN 
  
 
OBJECTIVE Modalities Rationale:     decrease inflammation and decrease pain to improve patient's ability to perform transfers and prolonged sitting/standing activities  
 min [] Estim, type/location:    
                                 []  att     []  unatt     []  w/US     []  w/ice    []  w/heat  
 min []  Mechanical Traction: type/lbs   
               []  pro   []  sup   []  int   []  cont    []  before manual    []  after manual  
 min []  Ultrasound, settings/location:    
 min []  Iontophoresis w/ dexamethasone, location:   
                                           []  take home patch       []  in clinic  
10 min [x]  Ice     []  Heat    location/position: To L/S; supine with wedge post-session  
 min []  Vasopneumatic Device, press/temp:   
 min []  Other:   
[] Skin assessment post-treatment (if applicable):   
[]  intact    []  redness- no adverse reaction    
[]redness  adverse reaction:     
 
33 min Therapeutic Exercise:  [x]  See flow sheet Rationale:      increase ROM and increase strength to improve the patients ability to perform prolonged sitting/standing activities 10 min Manual Therapy: STM/DTM to R glute med/piriformis, lumbosacral junction, and QL in prone; SI check  - even Rationale:      decrease pain, increase ROM and decrease trigger points to improve patient's ability to return to pain-free sitting activities during work. min Therapeutic Activity:   
Rationale:     
 
 min Neuromuscular Re-ed:   
Rationale:  
 
 min Gait Training:   
Rationale:     
 
 min Patient Education:  yes  Reviewed HEP []  Progressed/Changed HEP based on: Other Objective/Functional Measures: FOTO: 60 
GROC: +6 
 
 
Current L/S AROM: flex hands to shin, extension limited by 25%, bilateral SB to fibular head, bilateral rotation WFL 
 
SEE PN Post Treatment Pain Level (on 0 to 10) scale:  0 / 10 ASSESSMENT Assessment/Changes in Function: SEE PN  
[]  See Progress Note/Recertification Patient will continue to benefit from skilled PT services to modify and progress therapeutic interventions, address functional mobility deficits, address ROM deficits, address strength deficits, analyze and address soft tissue restrictions, analyze and cue movement patterns, analyze and modify body mechanics/ergonomics and assess and modify postural abnormalities to attain remaining goals. Progress toward goals / Updated goals: SEE PN 
  
 
PLAN 
[]  Upgrade activities as tolerated yes Continue plan of care  
[]  Discharge due to :   
[]  Other:   
 
Therapist: ONEL Pastrana Date: 10/4/2018 Time: 6:44 PM  
 
Future Appointments Date Time Provider Raquel Conteh 10/4/2018 4:00 PM [address] [place] [address]  
10/9/2018 3:30 PM Edwin Esteban [address] [place] [address]  
10/11/2018 4:00 PM [address] [place] [address]  
10/16/2018 3:30 PM RAMSES Davis [facility]  
10/18/2018 4:00 PM [address] [place] [address]
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
## 2018-10-09 ENCOUNTER — HOSPITAL ENCOUNTER (OUTPATIENT)
Dept: PHYSICAL THERAPY | Age: 28
Discharge: HOME | End: 2018-10-09
Payer: SELF-PAY

## 2018-10-09 PROCEDURE — 97140 MANUAL THERAPY 1/> REGIONS: CPT

## 2018-10-09 PROCEDURE — 97110 THERAPEUTIC EXERCISES: CPT

## 2018-10-09 NOTE — PROGRESS NOTES
PHYSICAL THERAPY - DAILY TREATMENT NOTE Patient Name: Minnie Acosta        Date: 10/9/2018 : 1990   yes Patient  Verified Visit #:   15   of   15  Insurance: Payor: SELF PAY / Plan: [facility] SELF PAY / Product Type: Self Pay / In time: 3:27 Out time: 4:28 Total Treatment Time: 64 Medicare/[facility] Time Tracking (below) Total Timed Codes (min):  N/A 1:1 Treatment Time:  N/A  
TREATMENT AREA =  Low back pain [M54.5] SUBJECTIVE Pain Level (on 0 to 10 scale):  1  / 10 Medication Changes/New allergies or changes in medical history, any new surgeries or procedures?    no  If yes, update Summary List  
Subjective Functional Status/Changes:  []  No changes reported Pt reports seeing MD and she was told to continue with PT. Pt reports she informed MD that PT was helping. Pt reports she has been feeling nauseous off and on for 3 weeks, but would like to complete PT as tolerated. Pt reports MD is going to have blood work taken to see cause of nausea. Pt denies red flags. OBJECTIVE Modalities Rationale:     decrease inflammation and decrease pain to improve patient's ability to complete prolonged standing. 
 min [] Estim, type/location:   
                                 []  att     []  unatt     []  w/US     []  w/ice    []  w/heat 
 min []  Mechanical Traction: type/lbs   
               []  pro   []  sup   []  int   []  cont    []  before manual    []  after manual  
 min []  Ultrasound, settings/location:    
 min []  Iontophoresis w/ dexamethasone, location:   
                                           []  take home patch       []  in clinic  
10 min [x]  Ice     []  Heat    location/position: Long sitting to l/s and glutes  
 min []  Vasopneumatic Device, press/temp:   
 min []  Other:   
[x] Skin assessment post-treatment (if applicable):   
[x]  intact    [x]  no adverse reaction    
[]redness  adverse reaction:     
41 min Therapeutic Exercise:  [x]  See flow sheet Rationale:      increase ROM, increase strength and increase proprioception to improve the patients ability to complete prolonged standing. 10 min Manual Therapy: STM to R glute med/max, QL, l/s paraspinals and lumbosacral junction in prone. Rationale:      decrease pain, increase ROM and decrease trigger points to improve patient's ability to complete prolonged standing. N/A min Patient Education:  yes  Reviewed HEP []  Progressed/Changed HEP based on:  Reviewed red flags and to call 911/seek immediate medical attention if those occur. Reviewed importance of body mechanics. Pt instructed to use CP/ice prn 10-15 minutes on / 1 hour off. Pt reports understanding. Other Objective/Functional Measures: 
 
Increased resistance to improve glute strength/stability Good body mechanics with 16 lb box lift from floor <-> chest 
 
Held box carry per pt request due to feeling fatigue Post Treatment Pain Level (on 0 to 10) scale:  0  / 10 ASSESSMENT Assessment/Changes in Function:  
 
Pt denied sharp pains or red flags with therapeutic ex. Pt denied pain at end of session. []  See Progress Note/Recertification: 
Patient will continue to benefit from skilled PT services to modify and progress therapeutic interventions, address functional mobility deficits, address ROM deficits, address strength deficits, analyze and address soft tissue restrictions and analyze and cue movement patterns to attain remaining goals. Progress toward goals / Updated goals: 
Good mechanics with box lift, decreased pain levels at end of session - progressing towards new goals #2 and 3. PLAN [x]  Upgrade activities as tolerated yes Continue plan of care  
[]  Discharge due to :   
[]  Other:   
 
Therapist: Ana Red, PT Date: 10/9/2018 Time: 4:22 PM  
 
Future Appointments Date Time Provider Raquel Conteh 10/11/2018 4:00 PM Buzz Rossi [facility]  
10/16/2018 3:30 PM Ana Red, PT [facility] 10/18/2018 4:00 PM [address]

## 2018-10-11 ENCOUNTER — HOSPITAL ENCOUNTER (OUTPATIENT)
Dept: PHYSICAL THERAPY | Age: 28
Discharge: HOME | End: 2018-10-11
Payer: SELF-PAY

## 2018-10-11 PROCEDURE — 97110 THERAPEUTIC EXERCISES: CPT

## 2018-10-11 PROCEDURE — 97140 MANUAL THERAPY 1/> REGIONS: CPT

## 2018-10-16 ENCOUNTER — HOSPITAL ENCOUNTER (OUTPATIENT)
Dept: PHYSICAL THERAPY | Age: 28
Discharge: HOME | End: 2018-10-16
Payer: SELF-PAY

## 2018-10-16 PROCEDURE — 97110 THERAPEUTIC EXERCISES: CPT

## 2018-10-16 PROCEDURE — 97140 MANUAL THERAPY 1/> REGIONS: CPT

## 2018-10-16 NOTE — PROGRESS NOTES
PHYSICAL THERAPY - DAILY TREATMENT NOTE Patient Name: Fabian Spurling        Date: 10/16/2018 : 1990   yes Patient  Verified Visit #:   14   of   15  Insurance: Payor: SELF PAY / Plan: [facility] SELF PAY / Product Type: Self Pay / In time: 3:34 Out time: 4:46 Total Treatment Time: 72 Medicare/[facility] Time Tracking (below) Total Timed Codes (min):  N/A 1:1 Treatment Time:  N/A  
TREATMENT AREA =  Low back pain [M54.5] SUBJECTIVE Pain Level (on 0 to 10 scale):  0  / 10 Medication Changes/New allergies or changes in medical history, any new surgeries or procedures?    no  If yes, update Summary List  
Subjective Functional Status/Changes:  []  No changes reported Pt reports compliance with HEP. Pt denies falls or red flags. OBJECTIVE Modalities Rationale:     decrease inflammation and decrease pain to improve patient's ability to complete prolonged standing. 
 min [] Estim, type/location:   
                                 []  att     []  unatt     []  w/US     []  w/ice    []  w/heat 
 min []  Mechanical Traction: type/lbs   
               []  pro   []  sup   []  int   []  cont    []  before manual    []  after manual  
 min []  Ultrasound, settings/location:    
 min []  Iontophoresis w/ dexamethasone, location:   
                                           []  take home patch       []  in clinic  
10 min [x]  Ice     []  Heat    location/position: Long sitting to l/s and glutes  
 min []  Vasopneumatic Device, press/temp:   
 min []  Other:   
[x] Skin assessment post-treatment (if applicable):   
[x]  intact    [x]  no adverse reaction    
[]redness  adverse reaction:     
52 min Therapeutic Exercise:  [x]  See flow sheet Rationale:      increase ROM, increase strength and increase proprioception to improve the patients ability to complete prolonged standing. 10 min Manual Therapy: STM to B glute med/max, QL, l/s paraspinals and lumbosacral junction in prone. Rationale:      decrease pain, increase ROM and decrease trigger points to improve patient's ability to complete prolonged standing. N/A min Patient Education:  yes  Reviewed HEP []  Progressed/Changed HEP based on: Reviewed importance of body mechanics. Pt instructed to use CP/ice prn 10-15 minutes on / 1 hour off. Pt reports understanding. Other Objective/Functional Measures: *Progressed to full plank - good mechanics, no pain increase Post Treatment Pain Level (on 0 to 10) scale:  0  / 10 ASSESSMENT Assessment/Changes in Function:  
 
Pt denied sharp pains or red flags with therapeutic ex. Pt denied pain at end of session. []  See Progress Note/Recertification: 
Patient will continue to benefit from skilled PT services to modify and progress therapeutic interventions, address functional mobility deficits, address ROM deficits, address strength deficits, analyze and address soft tissue restrictions and analyze and cue movement patterns to attain remaining goals. Progress toward goals / Updated goals: 
20 second forward plank - progressing towards new goal #1. Reassess next session. PLAN [x]  Upgrade activities as tolerated yes Continue plan of care  
[]  Discharge due to :   
[x]  Other: Plan to re-assess for possible discharge to HEP next session. Therapist: Frannie Cash, PT Date: 10/16/2018 Time: 3:45 PM  
 
Future Appointments Date Time Provider Raquel Conteh 10/18/2018 4:00 PM [address]16 [facility]

## 2018-10-18 ENCOUNTER — HOSPITAL ENCOUNTER (OUTPATIENT)
Dept: PHYSICAL THERAPY | Age: 28
Discharge: HOME | End: 2018-10-18
Payer: SELF-PAY

## 2018-10-18 PROCEDURE — 97110 THERAPEUTIC EXERCISES: CPT

## 2018-10-18 NOTE — PROGRESS NOTES
PHYSICAL THERAPY - DAILY TREATMENT NOTE Patient Name: Negro Macias        Date: 10/18/2018 : 1990   yes Patient  Verified Visit #:   15   of   21  Insurance: Payor: SELF PAY / Plan: [facility] SELF PAY / Product Type: Self Pay / In time: 4:09 PM Out time: 4:50 PM  
Total Treatment Time: 41 Medicare/[employer] Time Tracking (below) Total Timed Codes (min):  NA 1:1 Treatment Time:  NA  
TREATMENT AREA =  Low back pain [M54.5] SUBJECTIVE Pain Level (on 0 to 10 scale):  0 / 10 Medication Changes/New allergies or changes in medical history, any new surgeries or procedures?    no  If yes, update Summary List  
Subjective Functional Status/Changes:  []  No changes reported Patient currently reports no low back pain today. SEE D/C OBJECTIVE Modalities Rationale:     decrease inflammation and decrease pain to improve patient's ability to perform transfers and prolonged sitting/standing activities  
 min [] Estim, type/location:   
                                 []  att     []  unatt     []  w/US     []  w/ice    []  w/heat 
 min []  Mechanical Traction: type/lbs   
               []  pro   []  sup   []  int   []  cont    []  before manual    []  after manual  
 min []  Ultrasound, settings/location:    
 min []  Iontophoresis w/ dexamethasone, location:   
                                           []  take home patch       []  in clinic  
10 min [x]  Ice     []  Heat    location/position: To L/S in longsit post-session  
 min []  Vasopneumatic Device, press/temp:   
 min []  Other:   
[] Skin assessment post-treatment (if applicable):   
[]  intact    []  redness- no adverse reaction    
[]redness  adverse reaction:     
31 min Therapeutic Exercise:  [x]  See flow sheet Rationale:      increase ROM and increase strength to improve the patients ability to perform prolonged sitting/standing activities 
 min Patient Education:  yes  Reviewed HEP []  Progressed/Changed HEP based on: Other Objective/Functional Measures: FOTO: 74 GROC: +6 SEE D/C Post Treatment Pain Level (on 0 to 10) scale:  0 / 10 ASSESSMENT Assessment/Changes in Function: SEE D/C []  See Progress Note/Recertification Patient will continue to benefit from skilled PT services to modify and progress therapeutic interventions, address functional mobility deficits, address ROM deficits, address strength deficits, analyze and address soft tissue restrictions, analyze and cue movement patterns, analyze and modify body mechanics/ergonomics and assess and modify postural abnormalities to attain remaining goals. Progress toward goals / Updated goals: SEE D/C 
  
 
PLAN 
[]  Upgrade activities as tolerated no Continue plan of care [x]  Discharge due to : Completed PT program; progressing or met all PT goals  
[]  Other:   
 
Therapist: ONEL Finn Date: 10/18/2018 Time: 5:43 PM  
 
Future Appointments Date Time Provider Raquel Conteh 10/18/2018  4:00 PM Meseret Ortega
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
